# Patient Record
Sex: FEMALE | NOT HISPANIC OR LATINO | Employment: STUDENT | URBAN - METROPOLITAN AREA
[De-identification: names, ages, dates, MRNs, and addresses within clinical notes are randomized per-mention and may not be internally consistent; named-entity substitution may affect disease eponyms.]

---

## 2017-07-27 ENCOUNTER — HOSPITAL ENCOUNTER (OUTPATIENT)
Facility: MEDICAL CENTER | Age: 22
End: 2017-07-27
Attending: PHYSICIAN ASSISTANT
Payer: COMMERCIAL

## 2017-07-27 LAB — GFR SERPL CREATININE-BSD FRML MDRD: >60 ML/MIN/1.73 M 2

## 2017-07-27 PROCEDURE — 84403 ASSAY OF TOTAL TESTOSTERONE: CPT

## 2017-07-27 PROCEDURE — 80053 COMPREHEN METABOLIC PANEL: CPT

## 2017-07-27 PROCEDURE — 84443 ASSAY THYROID STIM HORMONE: CPT

## 2017-07-27 PROCEDURE — 80061 LIPID PANEL: CPT

## 2017-07-27 PROCEDURE — 85025 COMPLETE CBC W/AUTO DIFF WBC: CPT

## 2017-07-28 LAB
ALBUMIN SERPL BCP-MCNC: 4.4 G/DL (ref 3.2–4.9)
ALBUMIN/GLOB SERPL: 1.6 G/DL
ALP SERPL-CCNC: 78 U/L (ref 30–99)
ALT SERPL-CCNC: 18 U/L (ref 2–50)
ANION GAP SERPL CALC-SCNC: 7 MMOL/L (ref 0–11.9)
AST SERPL-CCNC: 17 U/L (ref 12–45)
BASOPHILS # BLD AUTO: 0.2 % (ref 0–1.8)
BASOPHILS # BLD: 0.02 K/UL (ref 0–0.12)
BILIRUB SERPL-MCNC: 1.3 MG/DL (ref 0.1–1.5)
BUN SERPL-MCNC: 11 MG/DL (ref 8–22)
CALCIUM SERPL-MCNC: 9.9 MG/DL (ref 8.5–10.5)
CHLORIDE SERPL-SCNC: 104 MMOL/L (ref 96–112)
CHOLEST SERPL-MCNC: 207 MG/DL (ref 100–199)
CO2 SERPL-SCNC: 26 MMOL/L (ref 20–33)
CREAT SERPL-MCNC: 1.05 MG/DL (ref 0.5–1.4)
EOSINOPHIL # BLD AUTO: 0.52 K/UL (ref 0–0.51)
EOSINOPHIL NFR BLD: 5.6 % (ref 0–6.9)
ERYTHROCYTE [DISTWIDTH] IN BLOOD BY AUTOMATED COUNT: 43.1 FL (ref 35.9–50)
GLOBULIN SER CALC-MCNC: 2.8 G/DL (ref 1.9–3.5)
GLUCOSE SERPL-MCNC: 82 MG/DL (ref 65–99)
HCT VFR BLD AUTO: 53 % (ref 37–47)
HDLC SERPL-MCNC: 41 MG/DL
HGB BLD-MCNC: 17.3 G/DL (ref 12–16)
IMM GRANULOCYTES # BLD AUTO: 0.02 K/UL (ref 0–0.11)
IMM GRANULOCYTES NFR BLD AUTO: 0.2 % (ref 0–0.9)
LDLC SERPL CALC-MCNC: 135 MG/DL
LYMPHOCYTES # BLD AUTO: 2.71 K/UL (ref 1–4.8)
LYMPHOCYTES NFR BLD: 29.1 % (ref 22–41)
MCH RBC QN AUTO: 29.8 PG (ref 27–33)
MCHC RBC AUTO-ENTMCNC: 32.6 G/DL (ref 33.6–35)
MCV RBC AUTO: 91.4 FL (ref 81.4–97.8)
MONOCYTES # BLD AUTO: 0.57 K/UL (ref 0–0.85)
MONOCYTES NFR BLD AUTO: 6.1 % (ref 0–13.4)
NEUTROPHILS # BLD AUTO: 5.47 K/UL (ref 2–7.15)
NEUTROPHILS NFR BLD: 58.8 % (ref 44–72)
NRBC # BLD AUTO: 0 K/UL
NRBC BLD AUTO-RTO: 0 /100 WBC
PLATELET # BLD AUTO: 307 K/UL (ref 164–446)
PMV BLD AUTO: 9.6 FL (ref 9–12.9)
POTASSIUM SERPL-SCNC: 4.8 MMOL/L (ref 3.6–5.5)
PROT SERPL-MCNC: 7.2 G/DL (ref 6–8.2)
RBC # BLD AUTO: 5.8 M/UL (ref 4.2–5.4)
SODIUM SERPL-SCNC: 137 MMOL/L (ref 135–145)
TESTOST SERPL-MCNC: 732 NG/DL (ref 9–75)
TRIGL SERPL-MCNC: 156 MG/DL (ref 0–149)
TSH SERPL DL<=0.005 MIU/L-ACNC: 0.84 UIU/ML (ref 0.3–3.7)
WBC # BLD AUTO: 9.3 K/UL (ref 4.8–10.8)

## 2018-01-24 ENCOUNTER — HOSPITAL ENCOUNTER (OUTPATIENT)
Facility: MEDICAL CENTER | Age: 23
End: 2018-01-24
Attending: NURSE PRACTITIONER
Payer: COMMERCIAL

## 2018-01-24 LAB
BASOPHILS # BLD AUTO: 0.1 % (ref 0–1.8)
BASOPHILS # BLD: 0.01 K/UL (ref 0–0.12)
EOSINOPHIL # BLD AUTO: 0.58 K/UL (ref 0–0.51)
EOSINOPHIL NFR BLD: 7.5 % (ref 0–6.9)
ERYTHROCYTE [DISTWIDTH] IN BLOOD BY AUTOMATED COUNT: 41.3 FL (ref 35.9–50)
HCT VFR BLD AUTO: 55.7 % (ref 37–47)
HGB BLD-MCNC: 17.8 G/DL (ref 12–16)
IMM GRANULOCYTES # BLD AUTO: 0.01 K/UL (ref 0–0.11)
IMM GRANULOCYTES NFR BLD AUTO: 0.1 % (ref 0–0.9)
LYMPHOCYTES # BLD AUTO: 2.52 K/UL (ref 1–4.8)
LYMPHOCYTES NFR BLD: 32.4 % (ref 22–41)
MCH RBC QN AUTO: 28.4 PG (ref 27–33)
MCHC RBC AUTO-ENTMCNC: 32 G/DL (ref 33.6–35)
MCV RBC AUTO: 89 FL (ref 81.4–97.8)
MONOCYTES # BLD AUTO: 0.52 K/UL (ref 0–0.85)
MONOCYTES NFR BLD AUTO: 6.7 % (ref 0–13.4)
NEUTROPHILS # BLD AUTO: 4.13 K/UL (ref 2–7.15)
NEUTROPHILS NFR BLD: 53.2 % (ref 44–72)
NRBC # BLD AUTO: 0 K/UL
NRBC BLD-RTO: 0 /100 WBC
PLATELET # BLD AUTO: 368 K/UL (ref 164–446)
PMV BLD AUTO: 9.5 FL (ref 9–12.9)
RBC # BLD AUTO: 6.26 M/UL (ref 4.2–5.4)
TESTOST SERPL-MCNC: 981 NG/DL (ref 9–75)
WBC # BLD AUTO: 7.8 K/UL (ref 4.8–10.8)

## 2018-01-24 PROCEDURE — 85025 COMPLETE CBC W/AUTO DIFF WBC: CPT

## 2018-01-24 PROCEDURE — 80053 COMPREHEN METABOLIC PANEL: CPT

## 2018-01-24 PROCEDURE — 84403 ASSAY OF TOTAL TESTOSTERONE: CPT

## 2018-01-24 PROCEDURE — 80061 LIPID PANEL: CPT

## 2018-01-25 LAB
ALBUMIN SERPL BCP-MCNC: 4.6 G/DL (ref 3.2–4.9)
ALBUMIN/GLOB SERPL: 1.5 G/DL
ALP SERPL-CCNC: 77 U/L (ref 30–99)
ALT SERPL-CCNC: 19 U/L (ref 2–50)
ANION GAP SERPL CALC-SCNC: 7 MMOL/L (ref 0–11.9)
AST SERPL-CCNC: 18 U/L (ref 12–45)
BILIRUB SERPL-MCNC: 1.5 MG/DL (ref 0.1–1.5)
BUN SERPL-MCNC: 14 MG/DL (ref 8–22)
CALCIUM SERPL-MCNC: 10.4 MG/DL (ref 8.5–10.5)
CHLORIDE SERPL-SCNC: 105 MMOL/L (ref 96–112)
CHOLEST SERPL-MCNC: 218 MG/DL (ref 100–199)
CO2 SERPL-SCNC: 27 MMOL/L (ref 20–33)
CREAT SERPL-MCNC: 1.05 MG/DL (ref 0.5–1.4)
GLOBULIN SER CALC-MCNC: 3.1 G/DL (ref 1.9–3.5)
GLUCOSE SERPL-MCNC: 95 MG/DL (ref 65–99)
HDLC SERPL-MCNC: 45 MG/DL
LDLC SERPL CALC-MCNC: 139 MG/DL
POTASSIUM SERPL-SCNC: 4.4 MMOL/L (ref 3.6–5.5)
PROT SERPL-MCNC: 7.7 G/DL (ref 6–8.2)
SODIUM SERPL-SCNC: 139 MMOL/L (ref 135–145)
TRIGL SERPL-MCNC: 168 MG/DL (ref 0–149)

## 2018-05-15 ENCOUNTER — OFFICE VISIT (OUTPATIENT)
Dept: URGENT CARE | Facility: CLINIC | Age: 23
End: 2018-05-15
Payer: COMMERCIAL

## 2018-05-15 VITALS
HEIGHT: 63 IN | BODY MASS INDEX: 28.83 KG/M2 | OXYGEN SATURATION: 97 % | HEART RATE: 103 BPM | WEIGHT: 162.7 LBS | SYSTOLIC BLOOD PRESSURE: 126 MMHG | DIASTOLIC BLOOD PRESSURE: 98 MMHG | TEMPERATURE: 98.6 F | RESPIRATION RATE: 16 BRPM

## 2018-05-15 DIAGNOSIS — J02.9 PHARYNGITIS, UNSPECIFIED ETIOLOGY: ICD-10-CM

## 2018-05-15 DIAGNOSIS — R09.82 PND (POST-NASAL DRIP): ICD-10-CM

## 2018-05-15 DIAGNOSIS — R05.9 COUGH: ICD-10-CM

## 2018-05-15 PROCEDURE — 99214 OFFICE O/P EST MOD 30 MIN: CPT | Performed by: PHYSICIAN ASSISTANT

## 2018-05-15 RX ORDER — FLUTICASONE PROPIONATE 50 MCG
1 SPRAY, SUSPENSION (ML) NASAL DAILY
Qty: 16 G | Refills: 0 | Status: SHIPPED | OUTPATIENT
Start: 2018-05-15 | End: 2020-07-31

## 2018-05-15 RX ORDER — EUCALYPTUS/PEPPERMINT OIL
10 SOLUTION, NON-ORAL NASAL EVERY 6 HOURS PRN
OUTPATIENT
Start: 2018-05-15

## 2018-05-15 ASSESSMENT — ENCOUNTER SYMPTOMS
COUGH: 1
SORE THROAT: 1
FEVER: 0
DIARRHEA: 0
SINUS PAIN: 0
MYALGIAS: 0
EYE REDNESS: 0
SPUTUM PRODUCTION: 0
VOMITING: 0
CHILLS: 0
ABDOMINAL PAIN: 0
WHEEZING: 0
SHORTNESS OF BREATH: 0
EYE DISCHARGE: 0
NAUSEA: 0

## 2018-05-15 NOTE — LETTER
May 15, 2018       Patient: Asha Funez   YOB: 1995   Date of Visit: 5/15/2018         To Whom It May Concern:    It is my medical opinion that Asha Funez is permitted to return to work now without concern.        If you have any questions or concerns, please don't hesitate to call [phone]          Sincerely,          Matt Monreal P.A.-C.  Electronically Signed

## 2018-05-16 NOTE — PROGRESS NOTES
"Subjective:   Asha Funez is a 23 y.o. female who presents for Cough        Cough   This is a new problem. The cough is productive of sputum. Associated symptoms include ear congestion, nasal congestion and a sore throat. Pertinent negatives include no chest pain, chills, ear pain, eye redness, fever, myalgias, rash, shortness of breath or wheezing. Her past medical history is significant for environmental allergies ( no tx).   last one week of cough prod, denies fever/chills, fatigued, c/o ST, dry throat, denies ear pain, denies emesis/abdpain/diarrhea/rash, c/o nausea w/ that PMH of asthma, denies PMH of bronchitis, PMH of strep, denies PMH of pneumonia, does have seasonal allerg - no tx.     Review of Systems   Constitutional: Positive for malaise/fatigue. Negative for chills and fever.   HENT: Positive for congestion and sore throat. Negative for ear pain and sinus pain.    Eyes: Negative for discharge and redness.   Respiratory: Positive for cough. Negative for sputum production, shortness of breath and wheezing.    Cardiovascular: Negative for chest pain and leg swelling.   Gastrointestinal: Negative for abdominal pain, diarrhea, nausea and vomiting.   Musculoskeletal: Negative for myalgias.   Skin: Negative for rash.   Endo/Heme/Allergies: Positive for environmental allergies ( no tx).     No Known Allergies     I have worn a mask for the entire encounter with this patient.    Objective:   /98   Pulse (!) 103   Temp 37 °C (98.6 °F)   Resp 16   Ht 1.6 m (5' 3\")   Wt 73.8 kg (162 lb 11.2 oz)   SpO2 97%   BMI 28.82 kg/m²   Physical Exam   Constitutional: She is oriented to person, place, and time. She appears well-developed and well-nourished. No distress.   HENT:   Head: Normocephalic and atraumatic.   Right Ear: Tympanic membrane, external ear and ear canal normal.   Left Ear: Tympanic membrane, external ear and ear canal normal.   Nose: Nose normal.   Mouth/Throat: Uvula is midline and " mucous membranes are normal. Posterior oropharyngeal erythema ( mild PND) present. No oropharyngeal exudate, posterior oropharyngeal edema or tonsillar abscesses.   Eyes: Conjunctivae and lids are normal. Right eye exhibits no discharge. Left eye exhibits no discharge. No scleral icterus.   Neck: Neck supple.   Pulmonary/Chest: Effort normal. No respiratory distress. She has no decreased breath sounds. She has no wheezes. She has no rhonchi. She has no rales.   Musculoskeletal: Normal range of motion.   Lymphadenopathy:     She has cervical adenopathy ( mild bilat).   Neurological: She is alert and oriented to person, place, and time. She is not disoriented.   Skin: Skin is warm and dry. She is not diaphoretic. No erythema. No pallor.   Psychiatric: Her speech is normal and behavior is normal.   Nursing note and vitals reviewed.        Assessment/Plan:   Assessment    1. Pharyngitis, unspecified etiology  Supportive care is reviewed with patient/caregiver - recommend to push PO fluids and electrolytes, Nsaids/tylenol, netti pot/saline irrig, humidifier in home, flonase, ponaris, antihistamine, likely viral URI w/ some contribution of allerg - we discuss indications for abx    Return to clinic with lack of resolution or progression of symptoms.    - fluticasone (FLONASE) 50 MCG/ACT nasal spray; Spray 1 Spray in nose every day.  Dispense: 16 g; Refill: 0  - eucalyptus/peppermint oil (PONARIS NASAL) nasal emollient 0.5 mL; Spray 10 Drops in nose every 6 hours as needed.    2. PND (post-nasal drip)    - fluticasone (FLONASE) 50 MCG/ACT nasal spray; Spray 1 Spray in nose every day.  Dispense: 16 g; Refill: 0  - eucalyptus/peppermint oil (PONARIS NASAL) nasal emollient 0.5 mL; Spray 10 Drops in nose every 6 hours as needed.    3. Cough      Differential diagnosis, natural history, supportive care, and indications for immediate follow-up discussed.

## 2018-07-27 ENCOUNTER — HOSPITAL ENCOUNTER (OUTPATIENT)
Dept: LAB | Facility: MEDICAL CENTER | Age: 23
End: 2018-07-27
Attending: NURSE PRACTITIONER
Payer: COMMERCIAL

## 2018-07-27 PROCEDURE — 83036 HEMOGLOBIN GLYCOSYLATED A1C: CPT

## 2018-07-27 PROCEDURE — 80061 LIPID PANEL: CPT

## 2018-07-27 PROCEDURE — 84443 ASSAY THYROID STIM HORMONE: CPT

## 2018-07-27 PROCEDURE — 84403 ASSAY OF TOTAL TESTOSTERONE: CPT

## 2018-07-27 PROCEDURE — 80053 COMPREHEN METABOLIC PANEL: CPT

## 2018-07-27 PROCEDURE — 85027 COMPLETE CBC AUTOMATED: CPT

## 2018-07-28 LAB
ALBUMIN SERPL BCP-MCNC: 5.1 G/DL (ref 3.2–4.9)
ALBUMIN/GLOB SERPL: 2.2 G/DL
ALP SERPL-CCNC: 66 U/L (ref 30–99)
ALT SERPL-CCNC: 18 U/L (ref 2–50)
ANION GAP SERPL CALC-SCNC: 8 MMOL/L (ref 0–11.9)
AST SERPL-CCNC: 15 U/L (ref 12–45)
BILIRUB SERPL-MCNC: 1.6 MG/DL (ref 0.1–1.5)
BUN SERPL-MCNC: 14 MG/DL (ref 8–22)
CALCIUM SERPL-MCNC: 10.2 MG/DL (ref 8.5–10.5)
CHLORIDE SERPL-SCNC: 102 MMOL/L (ref 96–112)
CHOLEST SERPL-MCNC: 209 MG/DL (ref 100–199)
CO2 SERPL-SCNC: 29 MMOL/L (ref 20–33)
CREAT SERPL-MCNC: 0.95 MG/DL (ref 0.5–1.4)
ERYTHROCYTE [DISTWIDTH] IN BLOOD BY AUTOMATED COUNT: 43.7 FL (ref 35.9–50)
GLOBULIN SER CALC-MCNC: 2.3 G/DL (ref 1.9–3.5)
GLUCOSE SERPL-MCNC: 80 MG/DL (ref 65–99)
HCT VFR BLD AUTO: 53.4 % (ref 37–47)
HDLC SERPL-MCNC: 47 MG/DL
HGB BLD-MCNC: 17.4 G/DL (ref 12–16)
LDLC SERPL CALC-MCNC: 127 MG/DL
MCH RBC QN AUTO: 29.8 PG (ref 27–33)
MCHC RBC AUTO-ENTMCNC: 32.6 G/DL (ref 33.6–35)
MCV RBC AUTO: 91.6 FL (ref 81.4–97.8)
PLATELET # BLD AUTO: 307 K/UL (ref 164–446)
PMV BLD AUTO: 9.4 FL (ref 9–12.9)
POTASSIUM SERPL-SCNC: 4.8 MMOL/L (ref 3.6–5.5)
PROT SERPL-MCNC: 7.4 G/DL (ref 6–8.2)
RBC # BLD AUTO: 5.83 M/UL (ref 4.2–5.4)
SODIUM SERPL-SCNC: 139 MMOL/L (ref 135–145)
TESTOST SERPL-MCNC: 576 NG/DL (ref 9–75)
TRIGL SERPL-MCNC: 176 MG/DL (ref 0–149)
TSH SERPL DL<=0.005 MIU/L-ACNC: 0.66 UIU/ML (ref 0.38–5.33)
WBC # BLD AUTO: 7.9 K/UL (ref 4.8–10.8)

## 2018-07-31 LAB
EST. AVERAGE GLUCOSE BLD GHB EST-MCNC: 108 MG/DL
HBA1C MFR BLD: 5.4 % (ref 0–5.6)

## 2018-11-08 ENCOUNTER — HOSPITAL ENCOUNTER (OUTPATIENT)
Facility: MEDICAL CENTER | Age: 23
End: 2018-11-08
Payer: COMMERCIAL

## 2018-11-08 LAB
BDY FAT % MEASURED: 22.8 %
BP DIAS: 60 MMHG
BP SYS: 120 MMHG
DIABETES HTDIA: NO
EVENT NAME HTEVT: NORMAL
HYPERTENSION HTHYP: NO
SCREENING LOC CITY HTCIT: NORMAL
SCREENING LOC STATE HTSTA: NORMAL
SCREENING LOCATION HTLOC: NORMAL
SUBSCRIBER ID HTSID: NORMAL

## 2018-11-09 LAB
CHOLEST SERPL-MCNC: 244 MG/DL (ref 100–199)
FASTING STATUS PATIENT QL REPORTED: NORMAL
GLUCOSE SERPL-MCNC: 76 MG/DL (ref 65–99)
HDLC SERPL-MCNC: 49 MG/DL
LDLC SERPL CALC-MCNC: 161 MG/DL
TRIGL SERPL-MCNC: 172 MG/DL (ref 0–149)

## 2019-01-25 ENCOUNTER — HOSPITAL ENCOUNTER (OUTPATIENT)
Dept: LAB | Facility: MEDICAL CENTER | Age: 24
End: 2019-01-25
Attending: NURSE PRACTITIONER
Payer: COMMERCIAL

## 2019-01-25 LAB
ALBUMIN SERPL BCP-MCNC: 4.8 G/DL (ref 3.2–4.9)
ALBUMIN/GLOB SERPL: 1.7 G/DL
ALP SERPL-CCNC: 65 U/L (ref 30–99)
ALT SERPL-CCNC: 22 U/L (ref 2–50)
ANION GAP SERPL CALC-SCNC: 7 MMOL/L (ref 0–11.9)
AST SERPL-CCNC: 19 U/L (ref 12–45)
BILIRUB SERPL-MCNC: 1.6 MG/DL (ref 0.1–1.5)
BUN SERPL-MCNC: 17 MG/DL (ref 8–22)
CALCIUM SERPL-MCNC: 9.9 MG/DL (ref 8.5–10.5)
CHLORIDE SERPL-SCNC: 103 MMOL/L (ref 96–112)
CHOLEST SERPL-MCNC: 241 MG/DL (ref 100–199)
CO2 SERPL-SCNC: 27 MMOL/L (ref 20–33)
CREAT SERPL-MCNC: 1.01 MG/DL (ref 0.5–1.4)
ERYTHROCYTE [DISTWIDTH] IN BLOOD BY AUTOMATED COUNT: 40.2 FL (ref 35.9–50)
EST. AVERAGE GLUCOSE BLD GHB EST-MCNC: 100 MG/DL
GLOBULIN SER CALC-MCNC: 2.9 G/DL (ref 1.9–3.5)
GLUCOSE SERPL-MCNC: 86 MG/DL (ref 65–99)
HBA1C MFR BLD: 5.1 % (ref 0–5.6)
HCT VFR BLD AUTO: 54.5 % (ref 37–47)
HDLC SERPL-MCNC: 45 MG/DL
HGB BLD-MCNC: 17.9 G/DL (ref 12–16)
LDLC SERPL CALC-MCNC: 161 MG/DL
MCH RBC QN AUTO: 29.2 PG (ref 27–33)
MCHC RBC AUTO-ENTMCNC: 32.8 G/DL (ref 33.6–35)
MCV RBC AUTO: 89.1 FL (ref 81.4–97.8)
PLATELET # BLD AUTO: 326 K/UL (ref 164–446)
PMV BLD AUTO: 9.3 FL (ref 9–12.9)
POTASSIUM SERPL-SCNC: 4.5 MMOL/L (ref 3.6–5.5)
PROT SERPL-MCNC: 7.7 G/DL (ref 6–8.2)
RBC # BLD AUTO: 6.12 M/UL (ref 4.2–5.4)
SODIUM SERPL-SCNC: 137 MMOL/L (ref 135–145)
TESTOST SERPL-MCNC: 454 NG/DL (ref 9–75)
TRIGL SERPL-MCNC: 176 MG/DL (ref 0–149)
WBC # BLD AUTO: 8.3 K/UL (ref 4.8–10.8)

## 2019-01-25 PROCEDURE — 80061 LIPID PANEL: CPT

## 2019-01-25 PROCEDURE — 83036 HEMOGLOBIN GLYCOSYLATED A1C: CPT

## 2019-01-25 PROCEDURE — 85027 COMPLETE CBC AUTOMATED: CPT

## 2019-01-25 PROCEDURE — 84403 ASSAY OF TOTAL TESTOSTERONE: CPT

## 2019-01-25 PROCEDURE — 80053 COMPREHEN METABOLIC PANEL: CPT

## 2019-02-11 ENCOUNTER — HOSPITAL ENCOUNTER (OUTPATIENT)
Dept: LAB | Facility: MEDICAL CENTER | Age: 24
End: 2019-02-11
Attending: NURSE PRACTITIONER
Payer: COMMERCIAL

## 2019-02-11 PROCEDURE — 84439 ASSAY OF FREE THYROXINE: CPT

## 2019-02-11 PROCEDURE — 84480 ASSAY TRIIODOTHYRONINE (T3): CPT

## 2019-02-11 PROCEDURE — 84443 ASSAY THYROID STIM HORMONE: CPT

## 2019-02-13 LAB
T3 SERPL-MCNC: 142.7 NG/DL (ref 60–181)
T4 FREE SERPL-MCNC: 1 NG/DL (ref 0.53–1.43)
TSH SERPL DL<=0.005 MIU/L-ACNC: 1.36 UIU/ML (ref 0.38–5.33)

## 2019-07-25 ENCOUNTER — HOSPITAL ENCOUNTER (OUTPATIENT)
Dept: LAB | Facility: MEDICAL CENTER | Age: 24
End: 2019-07-25
Attending: NURSE PRACTITIONER

## 2019-07-25 LAB
ALBUMIN SERPL BCP-MCNC: 4.8 G/DL (ref 3.2–4.9)
ALBUMIN/GLOB SERPL: 1.6 G/DL
ALP SERPL-CCNC: 66 U/L (ref 30–99)
ALT SERPL-CCNC: 27 U/L (ref 2–50)
ANION GAP SERPL CALC-SCNC: 9 MMOL/L (ref 0–11.9)
AST SERPL-CCNC: 24 U/L (ref 12–45)
BASOPHILS # BLD AUTO: 0.3 % (ref 0–1.8)
BASOPHILS # BLD: 0.02 K/UL (ref 0–0.12)
BILIRUB SERPL-MCNC: 1.2 MG/DL (ref 0.1–1.5)
BUN SERPL-MCNC: 11 MG/DL (ref 8–22)
CALCIUM SERPL-MCNC: 10.1 MG/DL (ref 8.5–10.5)
CHLORIDE SERPL-SCNC: 103 MMOL/L (ref 96–112)
CHOLEST SERPL-MCNC: 234 MG/DL (ref 100–199)
CO2 SERPL-SCNC: 26 MMOL/L (ref 20–33)
CREAT SERPL-MCNC: 1.11 MG/DL (ref 0.5–1.4)
EOSINOPHIL # BLD AUTO: 0.34 K/UL (ref 0–0.51)
EOSINOPHIL NFR BLD: 4.8 % (ref 0–6.9)
ERYTHROCYTE [DISTWIDTH] IN BLOOD BY AUTOMATED COUNT: 44 FL (ref 35.9–50)
GLOBULIN SER CALC-MCNC: 3 G/DL (ref 1.9–3.5)
GLUCOSE SERPL-MCNC: 82 MG/DL (ref 65–99)
HCT VFR BLD AUTO: 58.8 % (ref 37–47)
HDLC SERPL-MCNC: 38 MG/DL
HGB BLD-MCNC: 18.1 G/DL (ref 12–16)
IMM GRANULOCYTES # BLD AUTO: 0.01 K/UL (ref 0–0.11)
IMM GRANULOCYTES NFR BLD AUTO: 0.1 % (ref 0–0.9)
LDLC SERPL CALC-MCNC: 152 MG/DL
LYMPHOCYTES # BLD AUTO: 2.66 K/UL (ref 1–4.8)
LYMPHOCYTES NFR BLD: 37.4 % (ref 22–41)
MCH RBC QN AUTO: 28.2 PG (ref 27–33)
MCHC RBC AUTO-ENTMCNC: 30.8 G/DL (ref 33.6–35)
MCV RBC AUTO: 91.6 FL (ref 81.4–97.8)
MONOCYTES # BLD AUTO: 0.47 K/UL (ref 0–0.85)
MONOCYTES NFR BLD AUTO: 6.6 % (ref 0–13.4)
NEUTROPHILS # BLD AUTO: 3.61 K/UL (ref 2–7.15)
NEUTROPHILS NFR BLD: 50.8 % (ref 44–72)
NRBC # BLD AUTO: 0 K/UL
NRBC BLD-RTO: 0 /100 WBC
PLATELET # BLD AUTO: 320 K/UL (ref 164–446)
PMV BLD AUTO: 9.4 FL (ref 9–12.9)
POTASSIUM SERPL-SCNC: 5.3 MMOL/L (ref 3.6–5.5)
PROT SERPL-MCNC: 7.8 G/DL (ref 6–8.2)
RBC # BLD AUTO: 6.42 M/UL (ref 4.2–5.4)
SODIUM SERPL-SCNC: 138 MMOL/L (ref 135–145)
TRIGL SERPL-MCNC: 220 MG/DL (ref 0–149)
TSH SERPL DL<=0.005 MIU/L-ACNC: 1.09 UIU/ML (ref 0.38–5.33)
WBC # BLD AUTO: 7.1 K/UL (ref 4.8–10.8)

## 2019-07-25 PROCEDURE — 85025 COMPLETE CBC W/AUTO DIFF WBC: CPT

## 2019-07-25 PROCEDURE — 84443 ASSAY THYROID STIM HORMONE: CPT

## 2019-07-25 PROCEDURE — 80061 LIPID PANEL: CPT

## 2019-07-25 PROCEDURE — 83036 HEMOGLOBIN GLYCOSYLATED A1C: CPT

## 2019-07-25 PROCEDURE — 80053 COMPREHEN METABOLIC PANEL: CPT

## 2019-07-26 LAB
EST. AVERAGE GLUCOSE BLD GHB EST-MCNC: 108 MG/DL
HBA1C MFR BLD: 5.4 % (ref 0–5.6)

## 2020-07-31 ENCOUNTER — HOSPITAL ENCOUNTER (OUTPATIENT)
Facility: MEDICAL CENTER | Age: 25
End: 2020-07-31
Attending: NURSE PRACTITIONER
Payer: COMMERCIAL

## 2020-07-31 ENCOUNTER — OFFICE VISIT (OUTPATIENT)
Dept: URGENT CARE | Facility: CLINIC | Age: 25
End: 2020-07-31
Payer: COMMERCIAL

## 2020-07-31 VITALS
TEMPERATURE: 97.8 F | HEIGHT: 64 IN | HEART RATE: 98 BPM | DIASTOLIC BLOOD PRESSURE: 86 MMHG | BODY MASS INDEX: 27.1 KG/M2 | SYSTOLIC BLOOD PRESSURE: 120 MMHG | WEIGHT: 158.73 LBS | RESPIRATION RATE: 16 BRPM | OXYGEN SATURATION: 98 %

## 2020-07-31 DIAGNOSIS — J02.9 SORE THROAT: ICD-10-CM

## 2020-07-31 LAB
HETEROPH AB SER QL LA: NEGATIVE
INT CON NEG: NORMAL
INT CON NEG: NORMAL
INT CON POS: NORMAL
INT CON POS: NORMAL
S PYO AG THROAT QL: NEGATIVE

## 2020-07-31 PROCEDURE — 99213 OFFICE O/P EST LOW 20 MIN: CPT | Performed by: NURSE PRACTITIONER

## 2020-07-31 PROCEDURE — 87070 CULTURE OTHR SPECIMN AEROBIC: CPT

## 2020-07-31 PROCEDURE — 86308 HETEROPHILE ANTIBODY SCREEN: CPT | Performed by: NURSE PRACTITIONER

## 2020-07-31 PROCEDURE — 87880 STREP A ASSAY W/OPTIC: CPT | Performed by: NURSE PRACTITIONER

## 2020-07-31 PROCEDURE — 87077 CULTURE AEROBIC IDENTIFY: CPT

## 2020-07-31 RX ORDER — TESTOSTERONE CYPIONATE 200 MG/ML
INJECTION, SOLUTION INTRAMUSCULAR
COMMUNITY
Start: 2020-07-02

## 2020-07-31 RX ADMIN — Medication 25 MG: at 12:54

## 2020-07-31 ASSESSMENT — ENCOUNTER SYMPTOMS
SPUTUM PRODUCTION: 0
NAUSEA: 0
SHORTNESS OF BREATH: 0
WHEEZING: 0
SORE THROAT: 1
FEVER: 0
HEMOPTYSIS: 0
STRIDOR: 0
VOMITING: 0
DIAPHORESIS: 0
COUGH: 0
SINUS PAIN: 0

## 2020-07-31 ASSESSMENT — FIBROSIS 4 INDEX: FIB4 SCORE: .3464101615137754587

## 2020-07-31 NOTE — LETTER
July 31, 2020         Patient: Morteza Funez   YOB: 1995   Date of Visit: 7/31/2020           To Whom it May Concern:    Morteza Funez was seen in my clinic on 7/31/2020 for acute illness and may return to work in 1-3 days as symptoms improve.  Please excuse absence from work due to illness.    If you have any questions or concerns, please don't hesitate to call.        Sincerely,           EDWIN Dominguez.  Electronically Signed

## 2020-07-31 NOTE — PROGRESS NOTES
"Subjective:      Morteza Funez is a 25 y.o. who presents with Pharyngitis (x 2 days / )            Morteza comes in today with a 4 day history of pharyngitis, worse on the right side.  Associated factors: noted chills and feeling clammy at onset of symptoms.  Denies any fever, myalgias, nasal congestion, otalgia, cough, difficulty speaking, breathing, or swallowing.  No ill contacts with similar symptoms.  Note: Morteza is taking hormone therapy and surgical steps for female to male transition.  Medical chart indicates female sex (biological sex at birth), however Morteza identifies as male.  Any incorrect use of gender pronouns in this note is unintentional as some she/he autotexts cannot be overridden.          Review of Systems   Constitutional: Positive for malaise/fatigue. Negative for diaphoresis and fever.   HENT: Positive for sore throat. Negative for congestion, ear pain and sinus pain.    Respiratory: Negative for cough, hemoptysis, sputum production, shortness of breath, wheezing and stridor.    Gastrointestinal: Negative for nausea and vomiting.   Skin: Negative for rash.     Medications, Allergies, and current problem list reviewed today in Epic     Objective:     Blood Pressure 120/86   Pulse 98   Temperature 36.6 °C (97.8 °F) (Temporal)   Respiration 16   Height 1.626 m (5' 4\")   Weight 72 kg (158 lb 11.7 oz)   Oxygen Saturation 98%   Body Mass Index 27.25 kg/m²      Physical Exam  Vitals signs reviewed.   Constitutional:       Appearance: She is well-developed.      Comments: Alert and oriented x3.  Normal appearance.  Non-toxic appearance.  No acute distress.  Not ill appearing.  No diaphoresis.     HENT:      Right Ear: Tympanic membrane, ear canal and external ear normal. There is no impacted cerumen.      Left Ear: Tympanic membrane, ear canal and external ear normal. There is no impacted cerumen.      Nose: Nose normal.      Mouth/Throat:      Pharynx: Oropharyngeal exudate and " posterior oropharyngeal erythema present.      Comments: Generalized oropharyngeal erythema with exudate noted on right tonsil.  Right tonsil demonstrates 2+ enlargement vs. Left tonsil 1+ enlargement.  No oropharyngeal edema or mass.  Uvula midline.  NO muffled voice.  No vesicular lesions or exanthem noted.  Eyes:      General: No scleral icterus.        Right eye: No discharge.         Left eye: No discharge.      Conjunctiva/sclera: Conjunctivae normal.      Pupils: Pupils are equal, round, and reactive to light.   Neck:      Musculoskeletal: Neck supple.      Thyroid: No thyromegaly.      Vascular: No JVD.      Trachea: No tracheal deviation.   Cardiovascular:      Rate and Rhythm: Normal rate and regular rhythm.      Heart sounds: Normal heart sounds. No murmur. No friction rub. No gallop.    Pulmonary:      Effort: Pulmonary effort is normal. No respiratory distress.      Breath sounds: Normal breath sounds. No stridor. No wheezing or rales.   Chest:      Chest wall: No tenderness.   Lymphadenopathy:      Cervical: No cervical adenopathy.   Skin:     General: Skin is warm and dry.      Findings: No erythema or rash.   Neurological:      Mental Status: She is oriented to person, place, and time.       POCT rapid strep a: negative  POCT mononucleosis: negative    In clinic medication: liquid benadryl 25 mg po: gargle and swallow in clinic; patient tolerated well. Sedation precautions discussed.          Assessment/Plan:       1. Sore throat  - POCT Rapid Strep A  - POCT Mononucleosis (mono)  - Maalox Plus - Diphenhydramine - Lidocaine (MBX Oral Solution); Take 5 mL by mouth 4 times a day as needed (Gargle and spit.  do not swallow) for up to 4 days.  Dispense: 100 mL; Refill: 0  - CULTURE THROAT; Future    Advised Morteza that based on the history and exam findings, this is likely a self-limiting viral illness.  There is no indication for antibiotics at this time.  Differential reviewed.    Discussed watchful  waiting with re-check for any persistent or worsening symptoms vs obtaining throat culture today.  He wishes to proceed with throat culture; specimen was collected in clinic and order submitted.    OTC NSAIDs or tylenol prn fever, pain.  OTC throat sprays or lozenges prn pharyngitis or MBX solution as prescribed.  .  Maintain adequate po hydration.  RTC in 3 days if symptoms persist, sooner if worse.  Morteza verbalized understanding of and agreed with plan of care.

## 2020-08-03 LAB
BACTERIA SPEC RESP CULT: NORMAL
SIGNIFICANT IND 70042: NORMAL
SITE SITE: NORMAL
SOURCE SOURCE: NORMAL

## 2020-12-03 ENCOUNTER — HOSPITAL ENCOUNTER (OUTPATIENT)
Dept: LAB | Facility: MEDICAL CENTER | Age: 25
End: 2020-12-03
Attending: SURGERY
Payer: COMMERCIAL

## 2020-12-03 LAB — COVID ORDER STATUS COVID19: NORMAL

## 2020-12-03 PROCEDURE — C9803 HOPD COVID-19 SPEC COLLECT: HCPCS

## 2020-12-03 PROCEDURE — U0003 INFECTIOUS AGENT DETECTION BY NUCLEIC ACID (DNA OR RNA); SEVERE ACUTE RESPIRATORY SYNDROME CORONAVIRUS 2 (SARS-COV-2) (CORONAVIRUS DISEASE [COVID-19]), AMPLIFIED PROBE TECHNIQUE, MAKING USE OF HIGH THROUGHPUT TECHNOLOGIES AS DESCRIBED BY CMS-2020-01-R: HCPCS

## 2020-12-04 LAB
SARS-COV-2 RNA RESP QL NAA+PROBE: NOTDETECTED
SPECIMEN SOURCE: NORMAL